# Patient Record
Sex: MALE | Race: WHITE | NOT HISPANIC OR LATINO | Employment: FULL TIME | URBAN - METROPOLITAN AREA
[De-identification: names, ages, dates, MRNs, and addresses within clinical notes are randomized per-mention and may not be internally consistent; named-entity substitution may affect disease eponyms.]

---

## 2020-02-04 ENCOUNTER — OFFICE VISIT (OUTPATIENT)
Dept: URGENT CARE | Facility: CLINIC | Age: 25
End: 2020-02-04
Payer: COMMERCIAL

## 2020-02-04 VITALS
DIASTOLIC BLOOD PRESSURE: 82 MMHG | HEART RATE: 70 BPM | RESPIRATION RATE: 18 BRPM | OXYGEN SATURATION: 96 % | TEMPERATURE: 98.1 F | SYSTOLIC BLOOD PRESSURE: 130 MMHG

## 2020-02-04 DIAGNOSIS — J03.90 EXUDATIVE TONSILLITIS: Primary | ICD-10-CM

## 2020-02-04 LAB — S PYO AG THROAT QL: NEGATIVE

## 2020-02-04 PROCEDURE — 87147 CULTURE TYPE IMMUNOLOGIC: CPT | Performed by: PHYSICIAN ASSISTANT

## 2020-02-04 PROCEDURE — 87070 CULTURE OTHR SPECIMN AEROBIC: CPT | Performed by: PHYSICIAN ASSISTANT

## 2020-02-04 PROCEDURE — 99213 OFFICE O/P EST LOW 20 MIN: CPT | Performed by: PHYSICIAN ASSISTANT

## 2020-02-04 RX ORDER — AMOXICILLIN 500 MG/1
500 TABLET, FILM COATED ORAL 3 TIMES DAILY
Qty: 30 TABLET | Refills: 0 | Status: SHIPPED | OUTPATIENT
Start: 2020-02-04 | End: 2020-02-14

## 2020-02-04 NOTE — LETTER
February 4, 2020     Patient: Jovi Sal   YOB: 1995   Date of Visit: 2/4/2020       To Whom It May Concern:    Above patient seen in office today for acute medical ailment  May  attempt return to work in next 1-3 days as tolerated           Sincerely,        Migdalia Bagley PA-C    CC: No Recipients

## 2020-02-04 NOTE — PATIENT INSTRUCTIONS
1  Rapid strep test was negative  Due to presentation, we will give antibiotic but this may be viral in origin  2  Throat swab will be sent for definitive culture  Results take approximately 48-72 hours to return  If you have not heard from the provider by the end of 3 business days, please call phone number at top of clinical summary to request the results  3  In the meantime you may do warm salt water gargles every 2-3 hours while awake; use  throat lozenges;  take Tylenol or Ibuprofen (as long as not contraindicated) as needed for sore throat symptoms  4   If significant worsening of throat pain, difficulty breathing, unable to swallow to the point of drooling, or "hot potato" voice proceed to ER for immediate medical attention  5   If sore throat is accompanied by any post nasal drip, nasal congestion, runny nose, sinus pressure, and / or cough may try over the counter cold medicine for symptom relief  These symptoms if they do occur usually peak around 8-10 days then slowly resolve over a couple weeks  Please note, yellow or green mucus does not always / typically mean bacterial infection  It can mean dehydrated mucous or mucous filled with old white blood cells that have been fighting your infection

## 2020-02-04 NOTE — PROGRESS NOTES
[facility]    NAME: Sparkle Osman is a 25 y o  male  : 1995    MRN: [id number]  DATE: 2020  TIME: 1:49 PM    Assessment and Plan   Exudative tonsillitis [J03 90]  1  Exudative tonsillitis  POCT rapid strepA    amoxicillin (AMOXIL) 500 MG tablet    Throat culture       Patient Instructions   Patient Instructions   1  Rapid strep test was negative  Due to presentation, we will give antibiotic but this may be viral in origin  2  Throat swab will be sent for definitive culture  Results take approximately 48-72 hours to return  If you have not heard from the provider by the end of 3 business days, please call phone number at top of clinical summary to request the results  3  In the meantime you may do warm salt water gargles every 2-3 hours while awake; use  throat lozenges;  take Tylenol or Ibuprofen (as long as not contraindicated) as needed for sore throat symptoms  4   If significant worsening of throat pain, difficulty breathing, unable to swallow to the point of drooling, or "hot potato" voice proceed to ER for immediate medical attention  5   If sore throat is accompanied by any post nasal drip, nasal congestion, runny nose, sinus pressure, and / or cough may try over the counter cold medicine for symptom relief  These symptoms if they do occur usually peak around 8-10 days then slowly resolve over a couple weeks  Please note, yellow or green mucus does not always / typically mean bacterial infection  It can mean dehydrated mucous or mucous filled with old white blood cells that have been fighting your infection  Chief Complaint     Chief Complaint   Patient presents with    Sore Throat     SOre throat for 2 days       History of Present Illness   Sparkle Osman presents to the clinic c/o  79-year-old male that started with a terrible sore throat and some body aches and pains yesterday  Denies any nasal congestion or drainage  Hurts to talk and swallow  Has not done anything to help himself  No known exposures  Was sent home from work early yesterday  Missed work today  Review of Systems   Review of Systems   Constitutional: Positive for activity change, appetite change and fatigue  Negative for chills, diaphoresis and fever  HENT: Positive for trouble swallowing and voice change  Negative for congestion, ear discharge, ear pain, postnasal drip, rhinorrhea, sinus pressure, sinus pain and sore throat  Eyes: Negative  Respiratory: Negative  Cardiovascular: Negative  Skin: Negative for rash  Hematological: Negative  Current Medications     No long-term medications on file  Current Allergies     Allergies as of 02/04/2020 - Reviewed 02/04/2020   Allergen Reaction Noted    Zithromax [azithromycin] Hives 02/04/2020          The following portions of the patient's history were reviewed and updated as appropriate: allergies, current medications, past family history, past medical history, past social history, past surgical history and problem list   History reviewed  No pertinent past medical history  History reviewed  No pertinent surgical history  History reviewed  No pertinent family history  Objective   /82   Pulse 70   Temp 98 1 °F (36 7 °C) (Tympanic)   Resp 18   SpO2 96%   No LMP for male patient  Physical Exam     Physical Exam   Constitutional: He is oriented to person, place, and time  He appears well-developed and well-nourished  Non-toxic appearance  He appears ill  No distress  HENT:   Head: Normocephalic and atraumatic  Right Ear: Hearing, tympanic membrane and ear canal normal  No drainage, swelling or tenderness  No middle ear effusion  Left Ear: Hearing, tympanic membrane and ear canal normal  No drainage, swelling or tenderness  No middle ear effusion  Mouth/Throat: Uvula is midline and mucous membranes are normal  No oral lesions  No uvula swelling   Oropharyngeal exudate, posterior oropharyngeal edema and posterior oropharyngeal erythema present  No tonsillar abscesses  Tonsils are 0 on the right  Tonsils are 0 on the left  Tonsillar exudate  Increased tonsil pharyngeal redness with exudate left tonsillar region  Uvula midline  Eyes: Pupils are equal, round, and reactive to light  EOM are normal  No scleral icterus  Neck: Normal range of motion  Neck supple  Cardiovascular: Normal rate, regular rhythm and normal heart sounds  Exam reveals no gallop and no friction rub  No murmur heard  Pulmonary/Chest: Effort normal  No stridor  No respiratory distress  He has no wheezes  He has no rhonchi  He has no rales  Lymphadenopathy:     He has no cervical adenopathy  Neurological: He is alert and oriented to person, place, and time  Skin: Skin is warm and dry  No rash noted  He is not diaphoretic  Psychiatric: He has a normal mood and affect  Nursing note and vitals reviewed

## 2020-02-06 LAB — BACTERIA THROAT CULT: ABNORMAL

## 2020-02-07 ENCOUNTER — TELEPHONE (OUTPATIENT)
Dept: URGENT CARE | Facility: CLINIC | Age: 25
End: 2020-02-07

## 2020-02-07 NOTE — TELEPHONE ENCOUNTER
Patient's throat culture returned showing bacteria  * Continue the antibiotic prescribed  If not improving as anticipated would recommend follow-up with your primary care provider office

## 2020-07-13 ENCOUNTER — OFFICE VISIT (OUTPATIENT)
Dept: URGENT CARE | Facility: CLINIC | Age: 25
End: 2020-07-13
Payer: COMMERCIAL

## 2020-07-13 VITALS — HEART RATE: 72 BPM | TEMPERATURE: 98.4 F | OXYGEN SATURATION: 98 % | RESPIRATION RATE: 18 BRPM

## 2020-07-13 DIAGNOSIS — R11.2 NAUSEA AND VOMITING, INTRACTABILITY OF VOMITING NOT SPECIFIED, UNSPECIFIED VOMITING TYPE: Primary | ICD-10-CM

## 2020-07-13 PROCEDURE — G0382 LEV 3 HOSP TYPE B ED VISIT: HCPCS | Performed by: PHYSICIAN ASSISTANT

## 2020-07-13 PROCEDURE — S9083 URGENT CARE CENTER GLOBAL: HCPCS | Performed by: PHYSICIAN ASSISTANT

## 2020-07-13 NOTE — PROGRESS NOTES
[facility]      NAME: Mark Betts is a 22 y o  male  : 1995    MRN: [id number]  DATE: 2020  TIME: 12:57 PM    Assessment and Plan   Nausea and vomiting, intractability of vomiting not specified, unspecified vomiting type [R11 2]  1  Nausea and vomiting, intractability of vomiting not specified, unspecified vomiting type       OFFERED PATIENT COVID TESTING  PATIENT DECLINED THIS TIME    Patient Instructions     Follow up with PCP in 3-5 days  Proceed to  ER if symptoms worsen  Offered patient COVID testing  Patient refused at this time   Tylenol as needed for fever or chills  Monitor symptoms closely  Chief Complaint     Chief Complaint   Patient presents with    Vomiting     Pt states that he believes he had 24 hr stomach bug after his daughter recently had it  States saturday n/v with abdominal pain   symptoms subsided, denies fever, cough, sob  Needs work note  History of Present Illness       Patient is a 66-year-old male presenting the office for nausea vomiting  Patient states that his symptoms occurred approximately 3 days ago and lasted for approximately 1 5 days in duration  Patient states that his daughter was diagnosed with viral gastroenteritis states that [facility] called as well  Patient denies any fevers any chills  Patient denies any problems with her eyes ears nose throat  Patient has any shortness of breath chest tightness chest pain cough  Patient denies any diarrhea  Patient has any muscle aches body aches  Patient denies any headache, neck pain, neck stiffness, dizziness, confusion  Patient denies any positive exposure to COVID  Patient offers no other complaints this time  Review of Systems   Review of Systems   Constitutional: Negative  HENT: Negative  Eyes: Negative  Respiratory: Negative  Cardiovascular: Negative  Gastrointestinal: Positive for nausea and vomiting   Negative for abdominal distention, abdominal pain, anal bleeding, blood in stool, constipation, diarrhea and rectal pain  Endocrine: Negative  Genitourinary: Negative  Musculoskeletal: Negative  Skin: Negative  Allergic/Immunologic: Negative  Neurological: Negative  Hematological: Negative  Psychiatric/Behavioral: Negative  Current Medications     No current outpatient medications on file  Current Allergies     Allergies as of 07/13/2020 - Reviewed 07/13/2020   Allergen Reaction Noted    Zithromax [azithromycin] Hives 02/04/2020            The following portions of the patient's history were reviewed and updated as appropriate: allergies, current medications, past family history, past medical history, past social history, past surgical history and problem list      History reviewed  No pertinent past medical history  Past Surgical History:   Procedure Laterality Date    APPENDECTOMY         No family history on file  Medications have been verified  Objective   There were no vitals taken for this visit  Physical Exam     Physical Exam   Constitutional: He is oriented to person, place, and time  He appears well-developed and well-nourished  HENT:   Head: Normocephalic  Right Ear: External ear normal    Left Ear: External ear normal    Mouth/Throat: Oropharynx is clear and moist    Eyes: Pupils are equal, round, and reactive to light  Conjunctivae and EOM are normal    Neck: Normal range of motion  Cardiovascular: Normal rate, regular rhythm, normal heart sounds and intact distal pulses  Pulmonary/Chest: Effort normal and breath sounds normal    Neurological: He is alert and oriented to person, place, and time  Skin: Skin is warm  Capillary refill takes less than 2 seconds  Psychiatric: He has a normal mood and affect  His behavior is normal  Judgment and thought content normal    Nursing note and vitals reviewed

## 2020-07-13 NOTE — PATIENT INSTRUCTIONS
Follow up with PCP in 3-5 days  Proceed to  ER if symptoms worsen  Offered patient COVID testing  Patient refused at this time   Tylenol as needed for fever or chills  Monitor symptoms closely  Gastroenteritis   WHAT YOU NEED TO KNOW:   Gastroenteritis, or stomach flu, is an infection of the stomach and intestines  DISCHARGE INSTRUCTIONS:   Call 911 for any of the following:   · You have trouble breathing or a very fast pulse  Return to the emergency department if:   · You see blood in your diarrhea  · You cannot stop vomiting  · You have not urinated for 12 hours  · You feel like you are going to faint  Contact your healthcare provider if:   · You have a fever  · You continue to vomit or have diarrhea, even after treatment  · You see worms in your diarrhea  · Your mouth or eyes are dry  You are not urinating as much or as often  · You have questions or concerns about your condition or care  Medicines:   · Medicines  may be given to stop vomiting or diarrhea, decrease abdominal cramps, or treat an infection  · Take your medicine as directed  Contact your healthcare provider if you think your medicine is not helping or if you have side effects  Tell him or her if you are allergic to any medicine  Keep a list of the medicines, vitamins, and herbs you take  Include the amounts, and when and why you take them  Bring the list or the pill bottles to follow-up visits  Carry your medicine list with you in case of an emergency  Manage your symptoms:   · Drink liquids as directed  Ask your healthcare provider how much liquid to drink each day, and which liquids are best for you  You may also need to drink an oral rehydration solution (ORS)  An ORS has the right amounts of sugar, salt, and minerals in water to replace body fluids  · Eat bland foods  When you feel hungry, begin eating soft, bland foods  Examples are bananas, clear soup, potatoes, and applesauce   Do not have dairy products, alcohol, sugary drinks, or drinks with caffeine until you feel better  · Rest as much as possible  Slowly start to do more each day when you begin to feel better  Prevent the spread of gastroenteritis:  Gastroenteritis can spread easily  Keep yourself, your family, and your surroundings clean to help prevent the spread of gastroenteritis:  · Wash your hands often  Use soap and water  Wash your hands after you use the bathroom, change a child's diapers, or sneeze  Wash your hands before you prepare or eat food  · Clean surfaces and do laundry often  Wash your clothes and towels separately from the rest of the laundry  Clean surfaces in your home with antibacterial  or bleach  · Clean food thoroughly and cook safely  Wash raw vegetables before you cook  Cook meat, fish, and eggs fully  Do not use the same dishes for raw meat as you do for other foods  Refrigerate any leftover food immediately  · Be aware when you camp or travel  Drink only clean water  Do not drink from rivers or lakes unless you purify or boil the water first  When you travel, drink bottled water and do not add ice  Do not eat fruit that has not been peeled  Do not eat raw fish or meat that is not fully cooked  Follow up with your healthcare provider as directed:  Write down your questions so you remember to ask them during your visits  © 2017 [employer] Information is for End User's use only and may not be sold, redistributed or otherwise used for commercial purposes  All illustrations and images included in CareNotes® are the copyrighted property of [employer]  or [employer]  The above information is an  only  It is not intended as medical advice for individual conditions or treatments  Talk to your doctor, nurse or pharmacist before following any medical regimen to see if it is safe and effective for you

## 2020-07-13 NOTE — LETTER
July 13, 2020     Patient: Stephani Fritz   YOB: 1995   Date of Visit: 7/13/2020       To Whom it May Concern: Isra Neville was seen in my clinic on 7/13/2020  He may return to work on 7/14/2020 pending symptoms       If you have any questions or concerns, please don't hesitate to call           Sincerely,          Alie Santos PA-C        CC: No Recipients

## 2020-11-02 ENCOUNTER — OFFICE VISIT (OUTPATIENT)
Dept: URGENT CARE | Facility: CLINIC | Age: 25
End: 2020-11-02
Payer: COMMERCIAL

## 2020-11-02 VITALS
TEMPERATURE: 97.9 F | SYSTOLIC BLOOD PRESSURE: 118 MMHG | RESPIRATION RATE: 20 BRPM | HEART RATE: 78 BPM | OXYGEN SATURATION: 98 % | DIASTOLIC BLOOD PRESSURE: 72 MMHG

## 2020-11-02 DIAGNOSIS — R43.2 LOSS OF TASTE: ICD-10-CM

## 2020-11-02 DIAGNOSIS — J06.9 ACUTE URI: Primary | ICD-10-CM

## 2020-11-02 PROCEDURE — U0003 INFECTIOUS AGENT DETECTION BY NUCLEIC ACID (DNA OR RNA); SEVERE ACUTE RESPIRATORY SYNDROME CORONAVIRUS 2 (SARS-COV-2) (CORONAVIRUS DISEASE [COVID-19]), AMPLIFIED PROBE TECHNIQUE, MAKING USE OF HIGH THROUGHPUT TECHNOLOGIES AS DESCRIBED BY CMS-2020-01-R: HCPCS | Performed by: PHYSICIAN ASSISTANT

## 2020-11-02 PROCEDURE — S9083 URGENT CARE CENTER GLOBAL: HCPCS | Performed by: PHYSICIAN ASSISTANT

## 2020-11-02 PROCEDURE — G0382 LEV 3 HOSP TYPE B ED VISIT: HCPCS | Performed by: PHYSICIAN ASSISTANT

## 2020-11-03 LAB — SARS-COV-2 RNA SPEC QL NAA+PROBE: NOT DETECTED

## 2020-11-04 ENCOUNTER — TELEPHONE (OUTPATIENT)
Dept: URGENT CARE | Facility: CLINIC | Age: 25
End: 2020-11-04

## 2020-12-04 ENCOUNTER — OFFICE VISIT (OUTPATIENT)
Dept: FAMILY MEDICINE CLINIC | Facility: CLINIC | Age: 25
End: 2020-12-04
Payer: COMMERCIAL

## 2020-12-04 ENCOUNTER — APPOINTMENT (OUTPATIENT)
Dept: LAB | Facility: CLINIC | Age: 25
End: 2020-12-04
Payer: COMMERCIAL

## 2020-12-04 VITALS
SYSTOLIC BLOOD PRESSURE: 110 MMHG | HEIGHT: 71 IN | WEIGHT: 235 LBS | HEART RATE: 60 BPM | BODY MASS INDEX: 32.9 KG/M2 | TEMPERATURE: 97.7 F | OXYGEN SATURATION: 97 % | DIASTOLIC BLOOD PRESSURE: 80 MMHG

## 2020-12-04 DIAGNOSIS — K60.2 FISSURE IN ANO: ICD-10-CM

## 2020-12-04 DIAGNOSIS — R19.8 PAIN ASSOCIATED WITH DEFECATION: ICD-10-CM

## 2020-12-04 DIAGNOSIS — Z13.6 SCREENING FOR CARDIOVASCULAR CONDITION: ICD-10-CM

## 2020-12-04 DIAGNOSIS — K92.1 BLOOD IN STOOL: Primary | ICD-10-CM

## 2020-12-04 DIAGNOSIS — Z13.220 SCREENING, LIPID: ICD-10-CM

## 2020-12-04 DIAGNOSIS — Z13.29 SCREENING FOR ENDOCRINE DISORDER: ICD-10-CM

## 2020-12-04 LAB
ANION GAP SERPL CALCULATED.3IONS-SCNC: 4 MMOL/L (ref 4–13)
BASOPHILS # BLD AUTO: 0.03 THOUSANDS/ΜL (ref 0–0.1)
BASOPHILS NFR BLD AUTO: 0 % (ref 0–1)
BUN SERPL-MCNC: 12 MG/DL (ref 5–25)
CALCIUM SERPL-MCNC: 9.6 MG/DL (ref 8.3–10.1)
CHLORIDE SERPL-SCNC: 106 MMOL/L (ref 100–108)
CHOLEST SERPL-MCNC: 171 MG/DL (ref 50–200)
CO2 SERPL-SCNC: 30 MMOL/L (ref 21–32)
CREAT SERPL-MCNC: 1.13 MG/DL (ref 0.6–1.3)
CRP SERPL QL: <3 MG/L
EOSINOPHIL # BLD AUTO: 0.12 THOUSAND/ΜL (ref 0–0.61)
EOSINOPHIL NFR BLD AUTO: 2 % (ref 0–6)
ERYTHROCYTE [DISTWIDTH] IN BLOOD BY AUTOMATED COUNT: 12.8 % (ref 11.6–15.1)
GFR SERPL CREATININE-BSD FRML MDRD: 90 ML/MIN/1.73SQ M
GLUCOSE P FAST SERPL-MCNC: 78 MG/DL (ref 65–99)
HCT VFR BLD AUTO: 47.1 % (ref 36.5–49.3)
HDLC SERPL-MCNC: 43 MG/DL
HGB BLD-MCNC: 15.6 G/DL (ref 12–17)
IMM GRANULOCYTES # BLD AUTO: 0.02 THOUSAND/UL (ref 0–0.2)
IMM GRANULOCYTES NFR BLD AUTO: 0 % (ref 0–2)
LDLC SERPL CALC-MCNC: 113 MG/DL (ref 0–100)
LYMPHOCYTES # BLD AUTO: 1.85 THOUSANDS/ΜL (ref 0.6–4.47)
LYMPHOCYTES NFR BLD AUTO: 26 % (ref 14–44)
MCH RBC QN AUTO: 31.6 PG (ref 26.8–34.3)
MCHC RBC AUTO-ENTMCNC: 33.1 G/DL (ref 31.4–37.4)
MCV RBC AUTO: 95 FL (ref 82–98)
MONOCYTES # BLD AUTO: 0.47 THOUSAND/ΜL (ref 0.17–1.22)
MONOCYTES NFR BLD AUTO: 7 % (ref 4–12)
NEUTROPHILS # BLD AUTO: 4.56 THOUSANDS/ΜL (ref 1.85–7.62)
NEUTS SEG NFR BLD AUTO: 65 % (ref 43–75)
NONHDLC SERPL-MCNC: 128 MG/DL
NRBC BLD AUTO-RTO: 0 /100 WBCS
PLATELET # BLD AUTO: 202 THOUSANDS/UL (ref 149–390)
PMV BLD AUTO: 11.7 FL (ref 8.9–12.7)
POTASSIUM SERPL-SCNC: 3.7 MMOL/L (ref 3.5–5.3)
RBC # BLD AUTO: 4.94 MILLION/UL (ref 3.88–5.62)
SODIUM SERPL-SCNC: 140 MMOL/L (ref 136–145)
TRIGL SERPL-MCNC: 74 MG/DL
TSH SERPL DL<=0.05 MIU/L-ACNC: 0.88 UIU/ML (ref 0.36–3.74)
WBC # BLD AUTO: 7.05 THOUSAND/UL (ref 4.31–10.16)

## 2020-12-04 PROCEDURE — 84443 ASSAY THYROID STIM HORMONE: CPT

## 2020-12-04 PROCEDURE — 3725F SCREEN DEPRESSION PERFORMED: CPT | Performed by: FAMILY MEDICINE

## 2020-12-04 PROCEDURE — 82270 OCCULT BLOOD FECES: CPT | Performed by: FAMILY MEDICINE

## 2020-12-04 PROCEDURE — 36415 COLL VENOUS BLD VENIPUNCTURE: CPT

## 2020-12-04 PROCEDURE — 80061 LIPID PANEL: CPT

## 2020-12-04 PROCEDURE — 99204 OFFICE O/P NEW MOD 45 MIN: CPT | Performed by: FAMILY MEDICINE

## 2020-12-04 PROCEDURE — 3008F BODY MASS INDEX DOCD: CPT | Performed by: FAMILY MEDICINE

## 2020-12-04 PROCEDURE — 1036F TOBACCO NON-USER: CPT | Performed by: FAMILY MEDICINE

## 2020-12-04 PROCEDURE — 80048 BASIC METABOLIC PNL TOTAL CA: CPT

## 2020-12-04 PROCEDURE — 86140 C-REACTIVE PROTEIN: CPT

## 2020-12-04 PROCEDURE — 86255 FLUORESCENT ANTIBODY SCREEN: CPT

## 2020-12-04 PROCEDURE — 85025 COMPLETE CBC W/AUTO DIFF WBC: CPT

## 2020-12-04 RX ORDER — HYDROCORTISONE 25 MG/G
CREAM TOPICAL 2 TIMES DAILY
Qty: 28 G | Refills: 0 | Status: SHIPPED | OUTPATIENT
Start: 2020-12-04 | End: 2021-12-27 | Stop reason: ALTCHOICE

## 2020-12-05 PROBLEM — K92.1 BLOOD IN STOOL: Status: ACTIVE | Noted: 2020-12-04

## 2020-12-05 PROBLEM — K60.2 FISSURE IN ANO: Status: ACTIVE | Noted: 2020-12-04

## 2020-12-05 PROBLEM — R19.8 PAIN ASSOCIATED WITH DEFECATION: Status: ACTIVE | Noted: 2020-12-05

## 2020-12-05 PROBLEM — R19.8 PAIN ASSOCIATED WITH DEFECATION: Status: ACTIVE | Noted: 2020-12-04

## 2020-12-05 PROBLEM — K92.1 BLOOD IN STOOL: Status: ACTIVE | Noted: 2020-12-05

## 2020-12-05 PROBLEM — K60.2 FISSURE IN ANO: Status: ACTIVE | Noted: 2020-12-05

## 2020-12-05 LAB
ENDOMYSIUM IGA SER QL: NEGATIVE
SL AMB POCT FECES OCC BLD: NEGATIVE

## 2020-12-09 ENCOUNTER — TELEPHONE (OUTPATIENT)
Dept: FAMILY MEDICINE CLINIC | Facility: CLINIC | Age: 25
End: 2020-12-09

## 2020-12-09 DIAGNOSIS — K92.1 BLOOD IN STOOL: Primary | ICD-10-CM

## 2021-01-29 ENCOUNTER — TELEPHONE (OUTPATIENT)
Dept: GASTROENTEROLOGY | Facility: MEDICAL CENTER | Age: 26
End: 2021-01-29

## 2021-01-29 NOTE — TELEPHONE ENCOUNTER
Left message for pt to see if they'd like to switch to a virtual appointment due to impending weather for Monday 02/01/2021

## 2021-02-03 ENCOUNTER — TELEPHONE (OUTPATIENT)
Dept: GASTROENTEROLOGY | Facility: MEDICAL CENTER | Age: 26
End: 2021-02-03

## 2021-09-03 ENCOUNTER — TELEPHONE (OUTPATIENT)
Dept: FAMILY MEDICINE CLINIC | Facility: CLINIC | Age: 26
End: 2021-09-03

## 2021-09-03 NOTE — LETTER
September 3, 2021     [address] [facility] [address]      Dear Mr Sedrick Rinne:    In addition to helping you feel better when you are sick, we are interested in preventing illness and injury in the first place  In the spirit of maintaining your good health, our system indicates that you are due for the following:    Health Maintenance Due   Topic Date Due    Hepatitis C Screening  Never done    HPV Vaccine (1 - Male 2-dose series) Never done    COVID-19 Vaccine (1) Never done    HIV Screening  Never done    Annual Physical  Never done    DTaP,Tdap,and Td Vaccines (2 - Td or Tdap) 07/10/2021    Influenza Vaccine (1) 09/01/2021    BMI: Adult  12/04/2021    BMI: Followup Plan  12/05/2021       Please call us to make an appointment at your earliest convenience  I look forward to seeing you soon          Sincerely,       Jillian Jasso MD

## 2021-12-27 ENCOUNTER — APPOINTMENT (EMERGENCY)
Dept: CT IMAGING | Facility: HOSPITAL | Age: 26
End: 2021-12-27
Payer: COMMERCIAL

## 2021-12-27 ENCOUNTER — HOSPITAL ENCOUNTER (EMERGENCY)
Facility: HOSPITAL | Age: 26
Discharge: HOME | End: 2021-12-27
Attending: EMERGENCY MEDICINE | Admitting: EMERGENCY MEDICINE
Payer: COMMERCIAL

## 2021-12-27 ENCOUNTER — OFFICE VISIT (OUTPATIENT)
Dept: FAMILY MEDICINE CLINIC | Facility: CLINIC | Age: 26
End: 2021-12-27
Payer: COMMERCIAL

## 2021-12-27 VITALS
SYSTOLIC BLOOD PRESSURE: 128 MMHG | WEIGHT: 244.71 LBS | OXYGEN SATURATION: 100 % | RESPIRATION RATE: 18 BRPM | DIASTOLIC BLOOD PRESSURE: 70 MMHG | HEART RATE: 80 BPM | TEMPERATURE: 98.3 F | BODY MASS INDEX: 34.13 KG/M2

## 2021-12-27 VITALS
BODY MASS INDEX: 34.44 KG/M2 | DIASTOLIC BLOOD PRESSURE: 84 MMHG | TEMPERATURE: 101.7 F | OXYGEN SATURATION: 97 % | HEIGHT: 71 IN | SYSTOLIC BLOOD PRESSURE: 132 MMHG | WEIGHT: 246 LBS | RESPIRATION RATE: 16 BRPM | HEART RATE: 94 BPM

## 2021-12-27 DIAGNOSIS — R50.9 FEVER, UNSPECIFIED FEVER CAUSE: ICD-10-CM

## 2021-12-27 DIAGNOSIS — U07.1 COVID-19: Primary | ICD-10-CM

## 2021-12-27 DIAGNOSIS — K11.7 DROOLING: ICD-10-CM

## 2021-12-27 DIAGNOSIS — J02.9 PHARYNGITIS, UNSPECIFIED ETIOLOGY: Primary | ICD-10-CM

## 2021-12-27 DIAGNOSIS — J02.9 PHARYNGITIS: ICD-10-CM

## 2021-12-27 LAB
ANION GAP SERPL CALCULATED.3IONS-SCNC: 8 MMOL/L (ref 4–13)
BASOPHILS # BLD AUTO: 0.03 THOUSANDS/ΜL (ref 0–0.1)
BASOPHILS NFR BLD AUTO: 0 % (ref 0–1)
BUN SERPL-MCNC: 10 MG/DL (ref 5–25)
CALCIUM SERPL-MCNC: 8.5 MG/DL (ref 8.3–10.1)
CHLORIDE SERPL-SCNC: 101 MMOL/L (ref 100–108)
CO2 SERPL-SCNC: 30 MMOL/L (ref 21–32)
CREAT SERPL-MCNC: 1.08 MG/DL (ref 0.6–1.3)
EOSINOPHIL # BLD AUTO: 0.13 THOUSAND/ΜL (ref 0–0.61)
EOSINOPHIL NFR BLD AUTO: 1 % (ref 0–6)
ERYTHROCYTE [DISTWIDTH] IN BLOOD BY AUTOMATED COUNT: 12.9 % (ref 11.6–15.1)
FLUAV RNA RESP QL NAA+PROBE: NEGATIVE
FLUBV RNA RESP QL NAA+PROBE: NEGATIVE
GFR SERPL CREATININE-BSD FRML MDRD: 94 ML/MIN/1.73SQ M
GLUCOSE SERPL-MCNC: 92 MG/DL (ref 65–140)
HCT VFR BLD AUTO: 44.6 % (ref 36.5–49.3)
HGB BLD-MCNC: 15.7 G/DL (ref 12–17)
IMM GRANULOCYTES # BLD AUTO: 0.06 THOUSAND/UL (ref 0–0.2)
IMM GRANULOCYTES NFR BLD AUTO: 1 % (ref 0–2)
LYMPHOCYTES # BLD AUTO: 1.55 THOUSANDS/ΜL (ref 0.6–4.47)
LYMPHOCYTES NFR BLD AUTO: 12 % (ref 14–44)
MCH RBC QN AUTO: 33.1 PG (ref 26.8–34.3)
MCHC RBC AUTO-ENTMCNC: 35.2 G/DL (ref 31.4–37.4)
MCV RBC AUTO: 94 FL (ref 82–98)
MONOCYTES # BLD AUTO: 1.11 THOUSAND/ΜL (ref 0.17–1.22)
MONOCYTES NFR BLD AUTO: 9 % (ref 4–12)
NEUTROPHILS # BLD AUTO: 10.01 THOUSANDS/ΜL (ref 1.85–7.62)
NEUTS SEG NFR BLD AUTO: 77 % (ref 43–75)
NRBC BLD AUTO-RTO: 0 /100 WBCS
PLATELET # BLD AUTO: 174 THOUSANDS/UL (ref 149–390)
PMV BLD AUTO: 10.6 FL (ref 8.9–12.7)
POTASSIUM SERPL-SCNC: 3.6 MMOL/L (ref 3.5–5.3)
RBC # BLD AUTO: 4.74 MILLION/UL (ref 3.88–5.62)
RSV RNA RESP QL NAA+PROBE: NEGATIVE
SARS-COV-2 RNA RESP QL NAA+PROBE: POSITIVE
SODIUM SERPL-SCNC: 139 MMOL/L (ref 136–145)
WBC # BLD AUTO: 12.89 THOUSAND/UL (ref 4.31–10.16)

## 2021-12-27 PROCEDURE — 70491 CT SOFT TISSUE NECK W/DYE: CPT

## 2021-12-27 PROCEDURE — 1036F TOBACCO NON-USER: CPT | Performed by: NURSE PRACTITIONER

## 2021-12-27 PROCEDURE — 99215 OFFICE O/P EST HI 40 MIN: CPT | Performed by: NURSE PRACTITIONER

## 2021-12-27 PROCEDURE — 80048 BASIC METABOLIC PNL TOTAL CA: CPT | Performed by: EMERGENCY MEDICINE

## 2021-12-27 PROCEDURE — 96365 THER/PROPH/DIAG IV INF INIT: CPT

## 2021-12-27 PROCEDURE — 99284 EMERGENCY DEPT VISIT MOD MDM: CPT | Performed by: EMERGENCY MEDICINE

## 2021-12-27 PROCEDURE — 99284 EMERGENCY DEPT VISIT MOD MDM: CPT

## 2021-12-27 PROCEDURE — 86308 HETEROPHILE ANTIBODY SCREEN: CPT | Performed by: EMERGENCY MEDICINE

## 2021-12-27 PROCEDURE — G1004 CDSM NDSC: HCPCS

## 2021-12-27 PROCEDURE — 0241U HB NFCT DS VIR RESP RNA 4 TRGT: CPT | Performed by: EMERGENCY MEDICINE

## 2021-12-27 PROCEDURE — 3725F SCREEN DEPRESSION PERFORMED: CPT | Performed by: NURSE PRACTITIONER

## 2021-12-27 PROCEDURE — 36415 COLL VENOUS BLD VENIPUNCTURE: CPT | Performed by: EMERGENCY MEDICINE

## 2021-12-27 PROCEDURE — 3008F BODY MASS INDEX DOCD: CPT | Performed by: NURSE PRACTITIONER

## 2021-12-27 PROCEDURE — 85025 COMPLETE CBC W/AUTO DIFF WBC: CPT | Performed by: EMERGENCY MEDICINE

## 2021-12-27 PROCEDURE — 96375 TX/PRO/DX INJ NEW DRUG ADDON: CPT

## 2021-12-27 RX ORDER — DEXAMETHASONE SODIUM PHOSPHATE 4 MG/ML
10 INJECTION, SOLUTION INTRA-ARTICULAR; INTRALESIONAL; INTRAMUSCULAR; INTRAVENOUS; SOFT TISSUE ONCE
Status: COMPLETED | OUTPATIENT
Start: 2021-12-27 | End: 2021-12-27

## 2021-12-27 RX ORDER — KETOROLAC TROMETHAMINE 30 MG/ML
15 INJECTION, SOLUTION INTRAMUSCULAR; INTRAVENOUS ONCE
Status: COMPLETED | OUTPATIENT
Start: 2021-12-27 | End: 2021-12-27

## 2021-12-27 RX ORDER — LIDOCAINE HYDROCHLORIDE 20 MG/ML
15 SOLUTION OROPHARYNGEAL ONCE
Status: COMPLETED | OUTPATIENT
Start: 2021-12-27 | End: 2021-12-27

## 2021-12-27 RX ADMIN — IOHEXOL 85 ML: 350 INJECTION, SOLUTION INTRAVENOUS at 18:29

## 2021-12-27 RX ADMIN — DEXAMETHASONE SODIUM PHOSPHATE 10 MG: 4 INJECTION, SOLUTION INTRA-ARTICULAR; INTRALESIONAL; INTRAMUSCULAR; INTRAVENOUS; SOFT TISSUE at 17:06

## 2021-12-27 RX ADMIN — SODIUM CHLORIDE 3 G: 9 INJECTION, SOLUTION INTRAVENOUS at 17:10

## 2021-12-27 RX ADMIN — KETOROLAC TROMETHAMINE 15 MG: 30 INJECTION, SOLUTION INTRAMUSCULAR; INTRAVENOUS at 20:09

## 2021-12-27 RX ADMIN — LIDOCAINE HYDROCHLORIDE 15 ML: 20 SOLUTION ORAL; TOPICAL at 18:17

## 2021-12-27 NOTE — Clinical Note
Zoe Mathur was seen and treated in our emergency department on 12/27/2021  No work until cleared by Family Doctor/Orthopedics        Diagnosis: Femi Lawson    He may return on this date: If you have any questions or concerns, please don't hesitate to call        Lucy Fontanez, DO    ______________________________           _______________          _______________  Hospital Representative                              Date                                Time

## 2021-12-27 NOTE — ED ATTENDING ATTESTATION
12/27/2021  I, Kristy Mcdaniels MD, saw and evaluated the patient  I have discussed the patient with the resident/non-physician practitioner and agree with the resident's/non-physician practitioner's findings, Plan of Care, and MDM as documented in the resident's/non-physician practitioner's note, except where noted  All available labs and Radiology studies were reviewed  I was present for key portions of any procedure(s) performed by the resident/non-physician practitioner and I was immediately available to provide assistance  At this point I agree with the current assessment done in the Emergency Department  I have conducted an independent evaluation of this patient a history and physical is as follows:    33 YO male presents with sore throat  States this has been present for days, worsening  He notes difficulty with swallowing due to pain  He has had a fevers  Pt was evaluated at the PCP's today and instructed to present to the ED for possible peritonsillar abscess  Pt denies CP/SOB/N/V/D/C, no dysuria, burning on urination or blood in urine  Gen: Pt is in NAD  HEENT: Head is atraumatic, EOM's intact, neck has FROM, Enlarged tonsils B/L, patent airway, uvula midline, tender over cervical lymph nodes  Chest: CTAB, non-tender  Heart: RRR  Abdomen: Soft, NT/ND  Musculoskeletal: FROM in all extremities  Skin: No rash, no ecchymosis  Neuro: Awake, alert, oriented x4; Cranial nerves II-XII intact  Psych: Normal affect    MDM -  Pt with patent airway, no obvious PTA, uvula midline  Will check CBC, electrolytes, COVID/flu, monospot  Will CT neck to assess possible abscess  will give steroids, Unasyn     ED Course         Critical Care Time  Procedures

## 2021-12-27 NOTE — ED PROVIDER NOTES
History  Chief Complaint   Patient presents with    Abscess - Complicated     Pt was sent for peritonsillar abscess, pt is unable to maintan saliva without drooling  Pt also reports SOB due to abscess  Patient is a 32year old male who presents for evaluation of a sore throat  Patient states that he has had a sore throat since Thursday  Patient states that it has been worsening since then  This morning, patient woke up and was "choking on his saliva"  Patient endorses difficulty swallowing and difficulty breathing secondary to throat pain and swelling  Patient went to his PCP today and was told he had a peritonsillar abscess and was told to come to the ED for further evaluation  Patient states that at that time, he was also told he had a fever  Per chart review, it appears that patient's temp at that time was 101F  Patient denies any cough, shortness of breath, abdominal pain, nausea, or vomiting  States it is difficult to swallow because his throat hurts and his "saliva is thick", but is able to swallow  States that he has "had problems with his tonsils" in the past, but denies any previous PTA  Patient denies any sick contacts  Has not had his COVID vaccines         History provided by:  Patient   used: No    Sore Throat  Location:  Generalized  Duration:  5 days  Timing:  Constant  Progression:  Worsening  Associated symptoms: fever and trouble swallowing    Associated symptoms: no abdominal pain, no chest pain, no chills, no cough, no ear pain, no neck stiffness, no shortness of breath and no stridor    Fever:     Max temp PTA:  101 7    Temp source:  Tympanic  Risk factors: no sick contacts        None       Past Medical History:   Diagnosis Date    PTSD (post-traumatic stress disorder)        Past Surgical History:   Procedure Laterality Date    APPENDECTOMY      CIRCUMCISION         Family History   Problem Relation Age of Onset    Depression Mother     Anxiety disorder Mother  Liver disease Mother     Bipolar disorder Father      I have reviewed and agree with the history as documented  E-Cigarette/Vaping    E-Cigarette Use Never User      E-Cigarette/Vaping Substances    Nicotine No     THC No     CBD No     Flavoring No     Other No     Unknown No      Social History     Tobacco Use    Smoking status: Never Smoker    Smokeless tobacco: Never Used   Vaping Use    Vaping Use: Never used   Substance Use Topics    Alcohol use: Yes    Drug use: Never        Review of Systems   Constitutional: Positive for fever  Negative for chills  HENT: Positive for sore throat and trouble swallowing  Negative for ear pain  Respiratory: Negative for cough, shortness of breath and stridor  Cardiovascular: Negative for chest pain  Gastrointestinal: Negative for abdominal pain, nausea and vomiting  Genitourinary: Negative  Musculoskeletal: Negative for neck stiffness  Skin: Negative  Neurological: Negative  All other systems reviewed and are negative  Physical Exam  ED Triage Vitals [12/27/21 1303]   Temperature Pulse Respirations Blood Pressure SpO2   98 3 °F (36 8 °C) 85 20 135/68 100 %      Temp Source Heart Rate Source Patient Position - Orthostatic VS BP Location FiO2 (%)   Oral Monitor -- -- --      Pain Score       8             Orthostatic Vital Signs  Vitals:    12/27/21 1303   BP: 135/68   Pulse: 85       Physical Exam  Vitals and nursing note reviewed  Constitutional:       General: He is awake  He is not in acute distress  Appearance: He is ill-appearing  HENT:      Head: Normocephalic and atraumatic  Mouth/Throat:      Lips: Pink  Mouth: Mucous membranes are moist       Pharynx: Uvula midline  Tonsils: No tonsillar exudate  Comments: Bilateral tonsils swollen, R>L, uvula midline  Soft palate of mouth non-tender and not swollen, no abscess  Eyes:      General: Vision grossly intact  Gaze aligned appropriately  Cardiovascular:      Rate and Rhythm: Normal rate and regular rhythm  Heart sounds: Normal heart sounds  Pulmonary:      Effort: Pulmonary effort is normal  No respiratory distress  Breath sounds: Normal breath sounds  No stridor  Abdominal:      Palpations: Abdomen is soft  Tenderness: There is no abdominal tenderness  Musculoskeletal:      Cervical back: Full passive range of motion without pain and neck supple  Lymphadenopathy:      Cervical: Cervical adenopathy (anterior) present  Skin:     General: Skin is warm and dry  Neurological:      General: No focal deficit present  Mental Status: He is alert and oriented to person, place, and time  ED Medications  Medications   ketorolac (TORADOL) injection 15 mg (has no administration in time range)   dexamethasone (DECADRON) injection 10 mg (10 mg Intravenous Given 12/27/21 1706)   ampicillin-sulbactam (UNASYN) 3 g in sodium chloride 0 9 % 100 mL IVPB (0 g Intravenous Stopped 12/27/21 1740)   Lidocaine Viscous HCl (XYLOCAINE) 2 % mucosal solution 15 mL (15 mL Swish & Spit Given 12/27/21 1817)   iohexol (OMNIPAQUE) 350 MG/ML injection (SINGLE-DOSE) 100 mL (85 mL Intravenous Given 12/27/21 1829)       Diagnostic Studies  Results Reviewed     Procedure Component Value Units Date/Time    COVID/FLU/RSV - 2 hour TAT [[id number]]  (Abnormal) Collected: 12/27/21 1713    Lab Status: Final result Specimen: Nares from Nasopharyngeal Swab Updated: 12/27/21 1806     SARS-CoV-2 Positive     INFLUENZA A PCR Negative     INFLUENZA B PCR Negative     RSV PCR Negative    Narrative:      FOR PEDIATRIC PATIENTS - copy/paste COVID Guidelines URL to browser: [email or web address]  [facility]     This test has been authorized by FDA under an EUA (Emergency Use Assay) for use by authorized laboratories    Clinical caution and judgement should be used with the interpretation of these results with consideration of the clinical impression and other laboratory testing  Testing reported as "Positive" or "Negative" has been proven to be accurate according to standard laboratory validation requirements  All testing is performed with control materials showing appropriate reactivity at standard intervals      Basic metabolic panel [[id number]] Collected: 12/27/21 1713    Lab Status: Final result Specimen: Blood from Arm, Right Updated: 12/27/21 1739     Sodium 139 mmol/L      Potassium 3 6 mmol/L      Chloride 101 mmol/L      CO2 30 mmol/L      ANION GAP 8 mmol/L      BUN 10 mg/dL      Creatinine 1 08 mg/dL      Glucose 92 mg/dL      Calcium 8 5 mg/dL      eGFR 94 ml/min/1 73sq m     Narrative:      [place] guidelines for Chronic Kidney Disease (CKD):     Stage 1 with normal or high GFR (GFR > 90 mL/min/1 73 square meters)    Stage 2 Mild CKD (GFR = 60-89 mL/min/1 73 square meters)    Stage 3A Moderate CKD (GFR = 45-59 mL/min/1 73 square meters)    Stage 3B Moderate CKD (GFR = 30-44 mL/min/1 73 square meters)    Stage 4 Severe CKD (GFR = 15-29 mL/min/1 73 square meters)    Stage 5 End Stage CKD (GFR <15 mL/min/1 73 square meters)  Note: GFR calculation is accurate only with a steady state creatinine    CBC and differential [[id number]]  (Abnormal) Collected: 12/27/21 1713    Lab Status: Final result Specimen: Blood from Arm, Right Updated: 12/27/21 1720     WBC 12 89 Thousand/uL      RBC 4 74 Million/uL      Hemoglobin 15 7 g/dL      Hematocrit 44 6 %      MCV 94 fL      MCH 33 1 pg      MCHC 35 2 g/dL      RDW 12 9 %      MPV 10 6 fL      Platelets 855 Thousands/uL      nRBC 0 /100 WBCs      Neutrophils Relative 77 %      Immat GRANS % 1 %      Lymphocytes Relative 12 %      Monocytes Relative 9 %      Eosinophils Relative 1 %      Basophils Relative 0 %      Neutrophils Absolute 10 01 Thousands/µL      Immature Grans Absolute 0 06 Thousand/uL      Lymphocytes Absolute 1 55 Thousands/µL Monocytes Absolute 1 11 Thousand/µL      Eosinophils Absolute 0 13 Thousand/µL      Basophils Absolute 0 03 Thousands/µL     Mononucleosis screen [[id number]] Collected: 12/27/21 1713    Lab Status: In process Specimen: Blood from Arm, Right Updated: 12/27/21 1718                 CT soft tissue neck with contrast   Final Result by Dru Mortimer, MD (12/27 1932)         1  Mild symmetric prominence of both palatine tonsils which may be inflammatory in nature  No peritonsillar/tonsillar abscess noted  No airway compromise appreciated  2   Additional findings as noted  Workstation performed: [id number]               Procedures  Procedures      ED Course  ED Course as of 12/27/21 2008   Mon Dec 27, 2021   1810 SARS-COV-2(!): Positive   1934 CT soft tissue neck with contrast  No peritonsillar/tonsillar abscess noted                             SBIRT 20yo+      Most Recent Value   SBIRT (25 yo +)    In order to provide better care to our patients, we are screening all of our patients for alcohol and drug use  Would it be okay to ask you these screening questions? No Filed at: 12/27/2021 1718                MDM  Number of Diagnoses or Management Options  COVID-19: new and requires workup  Pharyngitis: new and requires workup  Diagnosis management comments: 32year old male presents with sore throat of 5 days duration  States he has difficulty swallowing  Was seen by PCP today who had concerns for possible PTA  On arrival, patient afebrile and in no acute distress  No stridor or wheezing  BL tonsils swollen, R>L, no exudates  Uvula midline  No soft palate swelling or abscess noted  Anterior cervical lymphadenopathy  Unsure if patient has PTA vs tonsillitis  Will evaluate with basic labs, monospot test, COVID swab, and CT soft tissue neck  Patient given decadron and dose of unasyn for treatment  Patient's labs WNL except for a mildly elevated WBC of 12  Patient was found to be COVID positive   CT scan of the neck negative for any tonsillar abscess  No airway occlusion  Patient was given viscous lidocaine and a dose of IV toradol for further symptom relief  Patient discharged to home in stable condition with instructions for symptomatic care  Patient given work note stating he is off work until cleared by PCP  Patient given strict ED return precautions  Amount and/or Complexity of Data Reviewed  Clinical lab tests: ordered and reviewed  Tests in the radiology section of CPT®: reviewed and ordered    Patient Progress  Patient progress: stable      Disposition  Final diagnoses:   Pharyngitis   COVID-19     Time reflects when diagnosis was documented in both MDM as applicable and the Disposition within this note     Time User Action Codes Description Comment    12/27/2021  7:34 PM Shoaib Keon Add [J02 9] Pharyngitis     12/27/2021  7:35 PM Shoaib Keon Add [U07 1] COVID-19     12/27/2021  7:35 PM Shoaib Keon Modify [J02 9] Pharyngitis     12/27/2021  7:35 PM Shoaib Keon Modify [U07 1] COVID-19       ED Disposition     ED Disposition Condition Date/Time Comment    Discharge Stable Mon Dec 27, 2021  7:34 PM Risa Schwab discharge to home/self care  Follow-up Information    None         Patient's Medications    No medications on file     No discharge procedures on file  PDMP Review     None           ED Provider  Attending physically available and evaluated Risa Schwab I managed the patient along with the ED Attending      Electronically Signed by         Teresa Centeno DO  12/27/21 2009

## 2021-12-28 PROBLEM — J02.9 PHARYNGITIS: Status: ACTIVE | Noted: 2021-12-28

## 2021-12-28 PROBLEM — R50.9 FEVER: Status: ACTIVE | Noted: 2021-12-28

## 2021-12-28 PROBLEM — K11.7 DROOLING: Status: ACTIVE | Noted: 2021-12-28

## 2021-12-28 LAB — HETEROPH AB SER QL: NEGATIVE

## 2021-12-28 NOTE — DISCHARGE INSTRUCTIONS
You may take over the counter pain medications including tylenol and motrin as needed for pain/fever  Please follow up with your PCP regarding length of quarantine  Return to the emergency department for any new or worsening symptoms including difficulty breathing or inability to swallow

## 2021-12-30 ENCOUNTER — TELEPHONE (OUTPATIENT)
Dept: FAMILY MEDICINE CLINIC | Facility: CLINIC | Age: 26
End: 2021-12-30

## 2022-01-03 ENCOUNTER — OFFICE VISIT (OUTPATIENT)
Dept: FAMILY MEDICINE CLINIC | Facility: CLINIC | Age: 27
End: 2022-01-03
Payer: COMMERCIAL

## 2022-01-03 VITALS
WEIGHT: 239 LBS | HEIGHT: 70 IN | DIASTOLIC BLOOD PRESSURE: 70 MMHG | BODY MASS INDEX: 34.22 KG/M2 | TEMPERATURE: 98.8 F | HEART RATE: 75 BPM | SYSTOLIC BLOOD PRESSURE: 110 MMHG | OXYGEN SATURATION: 96 %

## 2022-01-03 DIAGNOSIS — U07.1 COVID-19 VIRUS INFECTION: Primary | ICD-10-CM

## 2022-01-03 PROCEDURE — 1036F TOBACCO NON-USER: CPT | Performed by: NURSE PRACTITIONER

## 2022-01-03 PROCEDURE — 99213 OFFICE O/P EST LOW 20 MIN: CPT | Performed by: NURSE PRACTITIONER

## 2022-01-03 PROCEDURE — 3008F BODY MASS INDEX DOCD: CPT | Performed by: NURSE PRACTITIONER

## 2022-01-03 NOTE — LETTER
January 3, 2022    Patient: Zak Padilla  YOB: 1995  Date of Last Encounter: 1/3/2022      To whom it may concern: Zak Padilla has tested positive for COVID-19 (Coronavirus)  He may return to work on 1/4/2022, provided symptoms are improving and 24 hours without fever      Sincerely,         ANTOINETTE Lehman SPOKE WITH MOM AND EGD SCHEDULED FOR THIS Monday 10/8

## 2022-01-03 NOTE — PROGRESS NOTES
COVID-19 Outpatient Progress Note    Assessment/Plan:    Problem List Items Addressed This Visit        Other    COVID-19 virus infection - Primary     New diagnoses acute symptomatic patient positive for COVID 12/27/2021  Patient has not had COVID vaccine series completed  Patient started with symptoms 12/27/2021 and reports complete resolution of symptoms at this time  Will recommend continue to increase fluids COVID vitamins and can and quarantine and report back to work 01/04/2022 with strict masking for 5 days  Patient call with any worsening symptoms or concerns              Disposition:     Patient has COVID-19 infection  Based off CDC guidelines, they were recommended to isolate for 5 days from the date of the positive test  If they remain asymptomatic, isolation may be ended followed by 5 days of wearing a mask when around othes to minimize risk of infecting others  If they have a fever, continue to stay home until fever resolves for at least 24 hours  I have spent 15 minutes directly with the patient  Greater than 50% of this time was spent in counseling/coordination of care regarding: instructions for management and patient and family education  Encounter provider ANTOINETTE Schultz    Provider located at [facility] AT [address] [address] [phone]    Recent Visits  Date Type Provider Dept   01/03/22 Office Visit ANTOINETTE Schultz [facility]   12/30/21 Telephone Carlie ALCAZAR 6 recent visits within past 7 days and meeting all other requirements  Future Appointments  No visits were found meeting these conditions  Showing future appointments within next 150 days and meeting all other requirements     Subjective: Sonia Miller is a 32 y o  male who has been screened for COVID-19   Symptom change since last report: resolving  Patient denies fever, chills, fatigue, malaise, congestion, rhinorrhea, sore throat, anosmia, loss of taste, cough, shortness of breath, chest tightness, abdominal pain, nausea, vomiting, diarrhea, myalgias and headaches  Date of symptom onset: 12/27/2021  Date of positive COVID-19 PCR: 12/27/2021  COVID-19 vaccination status: Not vaccinated    Heri Carballo has been staying home and has isolated themselves in his home  He is taking care to not share personal items and is cleaning all surfaces that are touched often, like counters, tabletops, and doorknobs using household cleaning sprays or wipes  He is wearing a mask when he leaves his room  Lab Results   Component Value Date    SARSCOV2 Positive (A) 12/27/2021    Felipa Helms Not Detected 11/02/2020    SARSCORONAVI Detected (A) 05/11/2021     Past Medical History:   Diagnosis Date    PTSD (post-traumatic stress disorder)      Past Surgical History:   Procedure Laterality Date    APPENDECTOMY      CIRCUMCISION       No current outpatient medications on file  No current facility-administered medications for this visit  Allergies   Allergen Reactions    Zithromax [Azithromycin] Hives       Review of Systems   Constitutional: Negative for activity change, appetite change, chills, fatigue and fever  HENT: Negative for congestion, rhinorrhea, sinus pressure, sinus pain and sore throat  Respiratory: Negative for cough, chest tightness and shortness of breath  Gastrointestinal: Negative for abdominal pain, constipation, diarrhea, nausea and vomiting  Musculoskeletal: Negative for myalgias  Skin: Negative for color change, pallor and rash  Neurological: Negative for dizziness, syncope, weakness, light-headedness and headaches  Hematological: Negative for adenopathy  Psychiatric/Behavioral: Negative for agitation and confusion       Objective:    Vitals:    01/03/22 1312   BP: 110/70   Pulse: 75   Temp: 98 8 °F (37 1 °C)   TempSrc: Tympanic   SpO2: 96%   Weight: 108 kg (239 lb)   Height: 5' 10" (1 778 m)       Physical Exam  Vitals and nursing note reviewed  Constitutional:       General: He is not in acute distress  Appearance: Normal appearance  He is not ill-appearing, toxic-appearing or diaphoretic  HENT:      Head: Normocephalic and atraumatic  Nose: Nose normal  No congestion or rhinorrhea  Eyes:      General: No scleral icterus  Right eye: No discharge  Left eye: No discharge  Conjunctiva/sclera: Conjunctivae normal    Pulmonary:      Effort: Pulmonary effort is normal  No respiratory distress  Musculoskeletal:         General: Normal range of motion  Cervical back: Normal range of motion  Skin:     Coloration: Skin is not jaundiced or pale  Findings: No bruising, erythema, lesion or rash  Neurological:      Mental Status: He is alert and oriented to person, place, and time  Psychiatric:         Mood and Affect: Mood normal          Behavior: Behavior normal          Thought Content: Thought content normal          Judgment: Judgment normal          VIRTUAL VISIT DISCLAIMER    David Adler verbally agrees to participate in [employer]  Pt is aware that [employer] could be limited without vital signs or the ability to perform a full hands-on physical Alonza Chol understands he or the provider may request at any time to terminate the video visit and request the patient to seek care or treatment in person

## 2022-01-04 PROBLEM — U07.1 COVID-19 VIRUS INFECTION: Status: ACTIVE | Noted: 2022-01-04

## 2022-01-04 NOTE — ASSESSMENT & PLAN NOTE
New diagnoses acute symptomatic patient positive for COVID 12/27/2021  Patient has not had COVID vaccine series completed  Patient started with symptoms 12/27/2021 and reports complete resolution of symptoms at this time  Will recommend continue to increase fluids COVID vitamins and can and quarantine and report back to work 01/04/2022 with strict masking for 5 days    Patient call with any worsening symptoms or concerns

## 2022-05-25 ENCOUNTER — TELEPHONE (OUTPATIENT)
Dept: FAMILY MEDICINE CLINIC | Facility: CLINIC | Age: 27
End: 2022-05-25

## 2022-10-12 PROBLEM — R50.9 FEVER: Status: RESOLVED | Noted: 2021-12-28 | Resolved: 2022-10-12

## 2023-06-21 ENCOUNTER — TELEPHONE (OUTPATIENT)
Dept: FAMILY MEDICINE CLINIC | Facility: CLINIC | Age: 28
End: 2023-06-21

## 2023-09-13 ENCOUNTER — OFFICE VISIT (OUTPATIENT)
Dept: URGENT CARE | Facility: CLINIC | Age: 28
End: 2023-09-13
Payer: COMMERCIAL

## 2023-09-13 VITALS
TEMPERATURE: 97.9 F | OXYGEN SATURATION: 99 % | HEIGHT: 71 IN | BODY MASS INDEX: 35 KG/M2 | HEART RATE: 67 BPM | RESPIRATION RATE: 18 BRPM | WEIGHT: 250 LBS

## 2023-09-13 DIAGNOSIS — J06.9 UPPER RESPIRATORY TRACT INFECTION, UNSPECIFIED TYPE: Primary | ICD-10-CM

## 2023-09-13 PROCEDURE — 99213 OFFICE O/P EST LOW 20 MIN: CPT | Performed by: PHYSICIAN ASSISTANT

## 2023-09-13 NOTE — PATIENT INSTRUCTIONS
This appears to be viral illness and no antibiotic is indicated at this time. There are a number of viral respiratory illnesses that can present similarly. Most are self-limiting. Antibiotics do not help viral illnesses. Strongly encourage getting plenty of rest over the next few days. Increase your hydration. Vaporizer by bedside may also be helpful. Symptom Relief Suggestions: If you are having any sore throat or hoarseness,  you may do warm salt water gargles every 1-2 hours while awake, throat lozenges, Tylenol, voice rest, warm tea with honey. If you are having sinus pressure, nasal congestion, runny nose, and / or post nasal drip you may try the following to help ease your symptoms:            *Clearing your sinuses in a nice steamy shower may be helpful, especially first thing after waking. *Nasal saline rinses every 1-2 hours while awake may also help decrease nasal congestion, drainage. *Afrin nasal spray if significant nasal congestion at bedtime may use . (Do not use for over 3 days however.)       *Decongestant / expectorant such as Mucinex D 12 hour 1/2 to 1 tablet as needed with full glass of fluids may help decrease pressure and drainage. If you are having difficulty with ear pressure, discomfort:     Decongestant may be helpful. Flonase nasal spray. Warm compresses against ear(s) for comfort. Although bothersome, mucous is not necessarily a bad thing. Production of mucous is the body's way of trying to capture and flush irritants from mucosal surfaces. Yellow or green mucous does not necessarily mean you have a bacterial infection. Mucous will become more discolored over time, especially first thing in the morning, as your body's immune system  floods the mucosal surfaces with white bloods cells to try and help fight  infection. This white blood cell debride can also cause mucous to be discolored.   Again, using nasal saline spray frequently may help soothe and keep mucous flowing out versus getting dried, thickened and / or stuck leading to more sinus pain and pressure. If you have a cough, please realize that a cough is not necessarily a bad thing but a way that your body may be trying to keep your airways clear. Phlegm may be more discolored in the morning. Please note that discolored phlegm does not necessarily mean a bacterial infection. The following things may help with your cough:         *Warm tea with honey or a teaspoon of honey periodically throughout day and / or before bed. *You may also use plain Mucinex (an expectorant to help keep mucus thin so you can clear it easier) or Mucinex DM (expectorant / cough suyppressant) to help decrease cough if it is bothering your sleep. An expectorant works best if you take with full glass of fluids. Other night time cough medication options include Delsym, Robitussin DM, NyQuil. *Propping with an extra pillow or two may be helpful. *Keep water by your bedside to sip on as needed. *Cough drops. Most upper respiratory symptoms start to improve after 7-12 days days but may take a few weeks to completely resolve. Mucus may be more discolored first thing in the morning. Discolored mucous does not necessarily mean bacterial infection but can be dehydrated mucous or mucous filled with white blood cell debride that have been helping you to fight your illness. Follow up with your PCP office if not improving over next 10 days. If significant weakness, chest pain, shortness of breath proceed to ER for immediate further evaluation. Transmission precautions advised.

## 2023-09-13 NOTE — PROGRESS NOTES
[facility]    NAME: Liliya Silva is a 29 y.o. male  : 1995    MRN: [id number]  DATE: 2023  TIME: 3:13 PM    Assessment and Plan   Upper respiratory tract infection, unspecified type [J06.9]  1. Upper respiratory tract infection, unspecified type            Patient Instructions   Patient Instructions       This appears to be viral illness and no antibiotic is indicated at this time. There are a number of viral respiratory illnesses that can present similarly. Most are self-limiting. Antibiotics do not help viral illnesses. Strongly encourage getting plenty of rest over the next few days. Increase your hydration. Vaporizer by bedside may also be helpful. Symptom Relief Suggestions: If you are having any sore throat or hoarseness,  you may do warm salt water gargles every 1-2 hours while awake, throat lozenges, Tylenol, voice rest, warm tea with honey. If you are having sinus pressure, nasal congestion, runny nose, and / or post nasal drip you may try the following to help ease your symptoms:            *Clearing your sinuses in a nice steamy shower may be helpful, especially first thing after waking. *Nasal saline rinses every 1-2 hours while awake may also help decrease nasal congestion, drainage. *Afrin nasal spray if significant nasal congestion at bedtime may use . (Do not use for over 3 days however.)       *Decongestant / expectorant such as Mucinex D 12 hour 1/2 to 1 tablet as needed with full glass of fluids may help decrease pressure and drainage. If you are having difficulty with ear pressure, discomfort:     Decongestant may be helpful. Flonase nasal spray. Warm compresses against ear(s) for comfort. Although bothersome, mucous is not necessarily a bad thing. Production of mucous is the body's way of trying to capture and flush irritants from mucosal surfaces.   Yellow or green mucous does not necessarily mean you have a bacterial infection. Mucous will become more discolored over time, especially first thing in the morning, as your body's immune system  floods the mucosal surfaces with white bloods cells to try and help fight  infection. This white blood cell debride can also cause mucous to be discolored. Again, using nasal saline spray frequently may help soothe and keep mucous flowing out versus getting dried, thickened and / or stuck leading to more sinus pain and pressure. If you have a cough, please realize that a cough is not necessarily a bad thing but a way that your body may be trying to keep your airways clear. Phlegm may be more discolored in the morning. Please note that discolored phlegm does not necessarily mean a bacterial infection. The following things may help with your cough:         *Warm tea with honey or a teaspoon of honey periodically throughout day and / or before bed. *You may also use plain Mucinex (an expectorant to help keep mucus thin so you can clear it easier) or Mucinex DM (expectorant / cough suyppressant) to help decrease cough if it is bothering your sleep. An expectorant works best if you take with full glass of fluids. Other night time cough medication options include Delsym, Robitussin DM, NyQuil. *Propping with an extra pillow or two may be helpful. *Keep water by your bedside to sip on as needed. *Cough drops. Most upper respiratory symptoms start to improve after 7-12 days days but may take a few weeks to completely resolve. Mucus may be more discolored first thing in the morning. Discolored mucous does not necessarily mean bacterial infection but can be dehydrated mucous or mucous filled with white blood cell debride that have been helping you to fight your illness. Follow up with your PCP office if not improving over next 10 days.       If significant weakness, chest pain, shortness of breath proceed to ER for immediate further evaluation. Transmission precautions advised. Chief Complaint     Chief Complaint   Patient presents with   • Cold Like Symptoms     Patient with nasal congestion, chest tightness since Sunday. Girlfriend was sick, but did not have COIVD       History of Present Illness   Armani Deshpande presents to the clinic c/o  20-year-old male comes in with nasal congestion cough chest tightness. Started: Sunday. Associated signs and symptoms: Nasal congestion head congestion postnasal drip and cough. Sore throat with cough. Feels little tight in chest.  Modifying factors: DayQuil, NyQuil and Robitussin. Known Exposures: Girlfriend had similar illness. Negative for COVID. Hx asthma: Denies. Hx pneumonia: Denies. Smoker: Denies. Works in a [employer] with dry air. Seems to bother him when he is there currently. Review of Systems   Review of Systems   Constitutional: Positive for fatigue. Negative for activity change, appetite change, chills, diaphoresis, fever and unexpected weight change. HENT: Positive for congestion, postnasal drip, rhinorrhea and sore throat. Negative for ear discharge, ear pain, facial swelling, hearing loss, sinus pressure, sinus pain, trouble swallowing and voice change. Eyes: Negative for photophobia, pain, discharge, redness, itching and visual disturbance. Respiratory: Positive for cough. Negative for apnea, choking, chest tightness, shortness of breath, wheezing and stridor. Cardiovascular: Negative. Gastrointestinal: Positive for diarrhea. Negative for abdominal pain, nausea and vomiting. Diarrhea early on that has resolved. Skin: Negative for rash. Hematological: Negative for adenopathy. Current Medications     No long-term medications on file.        Current Allergies     Allergies as of 09/13/2023 - Reviewed 09/13/2023   Allergen Reaction Noted   • Zithromax [azithromycin] Hives 02/04/2020          The following portions of the patient's history were reviewed and updated as appropriate: allergies, current medications, past family history, past medical history, past social history, past surgical history and problem list.  Past Medical History:   Diagnosis Date   • PTSD (post-traumatic stress disorder)      Past Surgical History:   Procedure Laterality Date   • APPENDECTOMY     • CIRCUMCISION       Family History   Problem Relation Age of Onset   • Depression Mother    • Anxiety disorder Mother    • Liver disease Mother    • Bipolar disorder Father        Objective   Pulse 67   Temp 97.9 °F (36.6 °C) (Tympanic)   Resp 18   Ht 5' 11" (1.803 m)   Wt 113 kg (250 lb)   SpO2 99%   BMI 34.87 kg/m²   No LMP for male patient. Physical Exam     Physical Exam  Vitals and nursing note reviewed. Constitutional:       General: He is not in acute distress. Appearance: He is well-developed. He is ill-appearing. He is not toxic-appearing or diaphoretic. Comments: No trismus or conversational dyspnea. Appears mildly ill but in no acute distress. HENT:      Head: Normocephalic and atraumatic. Right Ear: Tympanic membrane, ear canal and external ear normal.      Left Ear: Tympanic membrane, ear canal and external ear normal.      Nose: Congestion and rhinorrhea present. Mouth/Throat:      Mouth: Mucous membranes are moist.      Pharynx: Posterior oropharyngeal erythema present. No oropharyngeal exudate. Comments: Cobblestoning posterior pharynx with patchy redness. Eyes:      General: No scleral icterus. Right eye: No discharge. Left eye: No discharge. Conjunctiva/sclera: Conjunctivae normal.      Pupils: Pupils are equal, round, and reactive to light. Neck:      Trachea: No tracheal deviation. Cardiovascular:      Rate and Rhythm: Normal rate and regular rhythm. Heart sounds: Normal heart sounds. No murmur heard. No friction rub. No gallop.    Pulmonary:      Effort: Pulmonary effort is normal. No respiratory distress. Breath sounds: Normal breath sounds. No stridor. No wheezing, rhonchi or rales. Musculoskeletal:      Cervical back: Normal range of motion and neck supple. No rigidity or tenderness. Lymphadenopathy:      Cervical: No cervical adenopathy. Skin:     General: Skin is warm and dry. Findings: No rash. Comments: No acute rashes   Neurological:      Mental Status: He is alert and oriented to person, place, and time.    Psychiatric:         Mood and Affect: Mood normal.         Behavior: Behavior normal.

## 2023-09-13 NOTE — LETTER
September 13, 2023     Patient: Karen Le   YOB: 1995   Date of Visit: 9/13/2023       To Whom It May Concern:    Above patient seen in office today for acute medical ailment. May  attempt return to work in next 2-3 days as tolerated.           Sincerely,        Martita Root PA-C    CC: No Recipients

## 2024-02-12 ENCOUNTER — TELEPHONE (OUTPATIENT)
Dept: FAMILY MEDICINE CLINIC | Facility: CLINIC | Age: 29
End: 2024-02-12

## 2024-04-16 ENCOUNTER — TELEPHONE (OUTPATIENT)
Dept: FAMILY MEDICINE CLINIC | Facility: CLINIC | Age: 29
End: 2024-04-16

## 2025-01-17 ENCOUNTER — TELEPHONE (OUTPATIENT)
Dept: FAMILY MEDICINE CLINIC | Facility: CLINIC | Age: 30
End: 2025-01-17